# Patient Record
Sex: FEMALE | Race: WHITE | NOT HISPANIC OR LATINO | Employment: FULL TIME | URBAN - METROPOLITAN AREA
[De-identification: names, ages, dates, MRNs, and addresses within clinical notes are randomized per-mention and may not be internally consistent; named-entity substitution may affect disease eponyms.]

---

## 2022-07-10 ENCOUNTER — HOSPITAL ENCOUNTER (EMERGENCY)
Facility: HOSPITAL | Age: 52
Discharge: HOME | End: 2022-07-10
Attending: EMERGENCY MEDICINE | Admitting: EMERGENCY MEDICINE
Payer: COMMERCIAL

## 2022-07-10 VITALS
RESPIRATION RATE: 19 BRPM | HEART RATE: 81 BPM | DIASTOLIC BLOOD PRESSURE: 78 MMHG | TEMPERATURE: 97.6 F | SYSTOLIC BLOOD PRESSURE: 156 MMHG | OXYGEN SATURATION: 96 %

## 2022-07-10 DIAGNOSIS — I45.10 RBBB: ICD-10-CM

## 2022-07-10 DIAGNOSIS — R11.2 NAUSEA & VOMITING: ICD-10-CM

## 2022-07-10 DIAGNOSIS — R10.13 EPIGASTRIC PAIN: Primary | ICD-10-CM

## 2022-07-10 LAB
2HR DELTA HS TROPONIN: <-1 NG/L
ALBUMIN SERPL BCP-MCNC: 3.8 G/DL (ref 3.5–5)
ALP SERPL-CCNC: 112 U/L (ref 46–116)
ALT SERPL W P-5'-P-CCNC: 19 U/L (ref 12–78)
ANION GAP SERPL CALCULATED.3IONS-SCNC: 13 MMOL/L (ref 4–13)
AST SERPL W P-5'-P-CCNC: 15 U/L (ref 5–45)
B-HCG SERPL-ACNC: 6 MIU/ML
BASOPHILS # BLD AUTO: 0.06 THOUSANDS/ΜL (ref 0–0.1)
BASOPHILS NFR BLD AUTO: 1 % (ref 0–1)
BILIRUB DIRECT SERPL-MCNC: 0.06 MG/DL (ref 0–0.2)
BILIRUB SERPL-MCNC: 0.35 MG/DL (ref 0.2–1)
BUN SERPL-MCNC: 12 MG/DL (ref 5–25)
CALCIUM SERPL-MCNC: 9.3 MG/DL (ref 8.3–10.1)
CARDIAC TROPONIN I PNL SERPL HS: 3 NG/L
CARDIAC TROPONIN I PNL SERPL HS: <2 NG/L
CHLORIDE SERPL-SCNC: 102 MMOL/L (ref 100–108)
CO2 SERPL-SCNC: 27 MMOL/L (ref 21–32)
CREAT SERPL-MCNC: 0.87 MG/DL (ref 0.6–1.3)
EOSINOPHIL # BLD AUTO: 0.08 THOUSAND/ΜL (ref 0–0.61)
EOSINOPHIL NFR BLD AUTO: 1 % (ref 0–6)
ERYTHROCYTE [DISTWIDTH] IN BLOOD BY AUTOMATED COUNT: 12.4 % (ref 11.6–15.1)
GFR SERPL CREATININE-BSD FRML MDRD: 76 ML/MIN/1.73SQ M
GLUCOSE SERPL-MCNC: 165 MG/DL (ref 65–140)
HCG SERPL QL: ABNORMAL
HCT VFR BLD AUTO: 51.9 % (ref 34.8–46.1)
HGB BLD-MCNC: 16.8 G/DL (ref 11.5–15.4)
IMM GRANULOCYTES # BLD AUTO: 0.04 THOUSAND/UL (ref 0–0.2)
IMM GRANULOCYTES NFR BLD AUTO: 0 % (ref 0–2)
LIPASE SERPL-CCNC: 192 U/L (ref 73–393)
LYMPHOCYTES # BLD AUTO: 1.97 THOUSANDS/ΜL (ref 0.6–4.47)
LYMPHOCYTES NFR BLD AUTO: 19 % (ref 14–44)
MCH RBC QN AUTO: 29.5 PG (ref 26.8–34.3)
MCHC RBC AUTO-ENTMCNC: 32.4 G/DL (ref 31.4–37.4)
MCV RBC AUTO: 91 FL (ref 82–98)
MONOCYTES # BLD AUTO: 0.38 THOUSAND/ΜL (ref 0.17–1.22)
MONOCYTES NFR BLD AUTO: 4 % (ref 4–12)
NEUTROPHILS # BLD AUTO: 7.97 THOUSANDS/ΜL (ref 1.85–7.62)
NEUTS SEG NFR BLD AUTO: 75 % (ref 43–75)
NRBC BLD AUTO-RTO: 0 /100 WBCS
PLATELET # BLD AUTO: 328 THOUSANDS/UL (ref 149–390)
PMV BLD AUTO: 9.8 FL (ref 8.9–12.7)
POTASSIUM SERPL-SCNC: 3.9 MMOL/L (ref 3.5–5.3)
PROT SERPL-MCNC: 8.1 G/DL (ref 6.4–8.2)
RBC # BLD AUTO: 5.69 MILLION/UL (ref 3.81–5.12)
SARS-COV-2 RNA RESP QL NAA+PROBE: NEGATIVE
SODIUM SERPL-SCNC: 142 MMOL/L (ref 136–145)
WBC # BLD AUTO: 10.5 THOUSAND/UL (ref 4.31–10.16)

## 2022-07-10 PROCEDURE — 84703 CHORIONIC GONADOTROPIN ASSAY: CPT | Performed by: EMERGENCY MEDICINE

## 2022-07-10 PROCEDURE — 80053 COMPREHEN METABOLIC PANEL: CPT | Performed by: EMERGENCY MEDICINE

## 2022-07-10 PROCEDURE — 99284 EMERGENCY DEPT VISIT MOD MDM: CPT

## 2022-07-10 PROCEDURE — 84702 CHORIONIC GONADOTROPIN TEST: CPT | Performed by: EMERGENCY MEDICINE

## 2022-07-10 PROCEDURE — 83690 ASSAY OF LIPASE: CPT | Performed by: EMERGENCY MEDICINE

## 2022-07-10 PROCEDURE — 96374 THER/PROPH/DIAG INJ IV PUSH: CPT

## 2022-07-10 PROCEDURE — U0003 INFECTIOUS AGENT DETECTION BY NUCLEIC ACID (DNA OR RNA); SEVERE ACUTE RESPIRATORY SYNDROME CORONAVIRUS 2 (SARS-COV-2) (CORONAVIRUS DISEASE [COVID-19]), AMPLIFIED PROBE TECHNIQUE, MAKING USE OF HIGH THROUGHPUT TECHNOLOGIES AS DESCRIBED BY CMS-2020-01-R: HCPCS | Performed by: EMERGENCY MEDICINE

## 2022-07-10 PROCEDURE — 84484 ASSAY OF TROPONIN QUANT: CPT | Performed by: EMERGENCY MEDICINE

## 2022-07-10 PROCEDURE — 93005 ELECTROCARDIOGRAM TRACING: CPT

## 2022-07-10 PROCEDURE — 82248 BILIRUBIN DIRECT: CPT | Performed by: EMERGENCY MEDICINE

## 2022-07-10 PROCEDURE — 36415 COLL VENOUS BLD VENIPUNCTURE: CPT | Performed by: EMERGENCY MEDICINE

## 2022-07-10 PROCEDURE — U0005 INFEC AGEN DETEC AMPLI PROBE: HCPCS | Performed by: EMERGENCY MEDICINE

## 2022-07-10 PROCEDURE — 96375 TX/PRO/DX INJ NEW DRUG ADDON: CPT

## 2022-07-10 PROCEDURE — 85025 COMPLETE CBC W/AUTO DIFF WBC: CPT | Performed by: EMERGENCY MEDICINE

## 2022-07-10 PROCEDURE — 99285 EMERGENCY DEPT VISIT HI MDM: CPT | Performed by: EMERGENCY MEDICINE

## 2022-07-10 RX ORDER — FAMOTIDINE 20 MG/1
20 TABLET, FILM COATED ORAL 2 TIMES DAILY
Qty: 30 TABLET | Refills: 0 | Status: SHIPPED | OUTPATIENT
Start: 2022-07-10

## 2022-07-10 RX ORDER — MAGNESIUM HYDROXIDE/ALUMINUM HYDROXICE/SIMETHICONE 120; 1200; 1200 MG/30ML; MG/30ML; MG/30ML
30 SUSPENSION ORAL ONCE
Status: COMPLETED | OUTPATIENT
Start: 2022-07-10 | End: 2022-07-10

## 2022-07-10 RX ORDER — MAGNESIUM HYDROXIDE/ALUMINUM HYDROXICE/SIMETHICONE 120; 1200; 1200 MG/30ML; MG/30ML; MG/30ML
30 SUSPENSION ORAL ONCE
Status: DISCONTINUED | OUTPATIENT
Start: 2022-07-10 | End: 2022-07-10 | Stop reason: HOSPADM

## 2022-07-10 RX ORDER — KETOROLAC TROMETHAMINE 30 MG/ML
15 INJECTION, SOLUTION INTRAMUSCULAR; INTRAVENOUS ONCE
Status: COMPLETED | OUTPATIENT
Start: 2022-07-10 | End: 2022-07-10

## 2022-07-10 RX ORDER — ONDANSETRON 4 MG/1
4 TABLET, ORALLY DISINTEGRATING ORAL EVERY 6 HOURS PRN
Qty: 20 TABLET | Refills: 0 | Status: SHIPPED | OUTPATIENT
Start: 2022-07-10

## 2022-07-10 RX ORDER — ONDANSETRON 2 MG/ML
4 INJECTION INTRAMUSCULAR; INTRAVENOUS ONCE
Status: COMPLETED | OUTPATIENT
Start: 2022-07-10 | End: 2022-07-10

## 2022-07-10 RX ORDER — FAMOTIDINE 20 MG/1
20 TABLET, FILM COATED ORAL ONCE
Status: COMPLETED | OUTPATIENT
Start: 2022-07-10 | End: 2022-07-10

## 2022-07-10 RX ADMIN — FAMOTIDINE 20 MG: 20 TABLET ORAL at 04:36

## 2022-07-10 RX ADMIN — KETOROLAC TROMETHAMINE 15 MG: 30 INJECTION, SOLUTION INTRAMUSCULAR at 04:36

## 2022-07-10 RX ADMIN — ONDANSETRON 4 MG: 2 INJECTION INTRAMUSCULAR; INTRAVENOUS at 04:36

## 2022-07-10 RX ADMIN — ALUMINUM HYDROXIDE, MAGNESIUM HYDROXIDE, AND SIMETHICONE 30 ML: 200; 200; 20 SUSPENSION ORAL at 04:36

## 2022-07-10 NOTE — ED PROVIDER NOTES
Final Diagnosis:  1  Epigastric pain    2  RBBB    3  Nausea & vomiting        Chief Complaint   Patient presents with    Abdominal Pain     Pt reports intermittent lower middle abdominal pain that feels like "cramping" that started yesterday, rates pain an 8, also reports nausea, with 6 bouts of vomiting  HPI  13-year-old presents with epigastric pain  Crampy colicky  She ate some questionable macro any salad  Had some vomiting thereafter  She also notes some lower abdominal pain as well  Vomiting nonbloody nonbilious  Normal bowel movements no urinary symptoms  No chest pain no shortness of breath  She does smoke  No prior abdominal surgeries beyond a hernia repair with mesh  Soft abdomen no CVA tenderness  Nondistressed appearing    - No language barrier    - History obtained from patient  - There are no limitations to the history obtained  - Previous charting underwent limited review with attention to last ED visits, labs, ekgs, and prior imaging  PMH:   has no past medical history on file  PSH:   has a past surgical history that includes Hernia repair  Social History:    Tobacco Use: High Risk    Smoking Tobacco Use: Current Every Day Smoker    Smokeless Tobacco Use: Never Used     Alcohol Use: Not on file     Patient with no illicit use    ROS:    Pertinent positives/negatives:   Review of Systems   Respiratory: Negative for shortness of breath  Cardiovascular: Negative for chest pain  Gastrointestinal: Positive for abdominal pain, nausea and vomiting  Negative for abdominal distention, constipation and diarrhea  Genitourinary: Negative for dysuria and flank pain  CONSTITUTIONAL:  No dizziness  No weakness  No unexpected weight loss  EYES:  No pain, erythema, or discharge  No loss of vision  ENT:  No tinnitus, decreased hearing  No epistaxis/purulent drainage  No voice change, airway closing, trismus  CARDIOVASCULAR:  No chest pain  No palpitations   No new lower extremity edema  RESPIRATORY:  No purulent cough  No hemoptysis  No dyspnea  No paroxysmal nocturnal dyspnea  No stridor, audible wheezing bedside  GASTROINTESTINAL:  Normal appetite  No vomiting, diarrhea  No pain  No bloating  No melena  GENITOURINARY:  No frequency, urgency, nocturia  No hematuria or dysuria  No discharge  No sores/adenopathy  MUSCULOSKELETAL:  No arthralgias or myalgias that are new  INTEGUMENTARY:  No swelling  No unexpected contusions  No abrasions  No lymphangitis  NEUROLOGIC:  No meningismus  No numbness of the extremities  No new focal weakness  No postural instability  PSYCHIATRIC:  No SI HI AVH  HEMATOLOGICAL:  No bleeding  No petechiae  No bruising  ALLERGIES:  No urticaria  No sudden abd cramping  No stridor  PE:     Physical exam highlights:   Physical Exam       Vitals:    07/10/22 0630 07/10/22 0645 07/10/22 0730 07/10/22 0739   BP: (!) 189/91  156/78    BP Location:       Pulse: 81 88 79 81   Resp:  19     Temp:       TempSrc:       SpO2: 94% 94% 96% 96%     Vitals reviewed by me  Nursing note reviewed  Chaperone present for all sensitive exam   Const: No acute distress  Alert  Nontoxic  Not diaphoretic  HEENT: External ears normal  No protrusion drainage swelling  Nose normal  No drainage/traumatic deformity  MMM  Mouth with baseline/symmetric movement  No trismus  Eyes: No squinting  No icterus  Tracks through the room with normal EOM  No tearing/swelling/drainage  Neck: ROM normal  No rigidity  No meningismus  Cards: Rate as per vitals  Compared to monitor sinus unless documented above  Regular  Well perfused  Pulm: able to verbalize without additional effort  Effort and excursion normal  No disress  No audible wheezing/ stridor  Normal resp rate  Abd: No distension beyond baseline  No fluctuant wave  Patient without peritoneal pain with shifting/bumping the bed  MSK: ROM normal and baseline  No deformity  Skin: No new rashes visible  Well perfused  Neuro: Nonfocal  Baseline  CN grossly intact  Moving all four with coordination  Psych: Normal behavior and affect  A:  - Nursing note reviewed  Ddx and MDM  PUD  Gastritis  Food poisoning  Cholecystitis  Pancreatitis unlikely  Atypical ACS    HEART Risk Score    Flowsheet Row Most Recent Value   Heart Score Risk Calculator    History 0 Filed at: 07/11/2022 0111   ECG 1 Filed at: 07/11/2022 0111   Age 1 Filed at: 07/11/2022 0111   Risk Factors 1 Filed at: 07/11/2022 0111   Troponin --   HEART Score --            RBBB  No prior EKG  Will follow with PCP for w/u           ED Course as of 07/11/22 0114   Sun Jul 10, 2022   6474 Procedure Note: EKG  Date/Time: 07/10/22 5:27 AM   Interpreted by: Kathi Escalona  Indications / Diagnosis: epigastric pain   ECG reviewed by me, the ED Provider: yes   The EKG demonstrates:  Rhythm: sinus   Intervals: normal intervals  Axis: left  axis  QRS/Blocks: RBBB QRS  ST Changes: No acute ST Changes, no STD/LUCY    T wave changes: inferior inversion, anterior inversion         No orders to display     Orders Placed This Encounter   Procedures    COVID only    CBC and differential    Comprehensive metabolic panel    Bilirubin, direct    Lipase    hCG, qualitative pregnancy    HS Troponin 0hr (reflex protocol)    hCG, quantitative    HS Troponin I 2hr    Urinalysis with microscopic    ECG 12 lead     Labs Reviewed   CBC AND DIFFERENTIAL - Abnormal       Result Value Ref Range Status    WBC 10 50 (*) 4 31 - 10 16 Thousand/uL Final    RBC 5 69 (*) 3 81 - 5 12 Million/uL Final    Hemoglobin 16 8 (*) 11 5 - 15 4 g/dL Final    Hematocrit 51 9 (*) 34 8 - 46 1 % Final    MCV 91  82 - 98 fL Final    MCH 29 5  26 8 - 34 3 pg Final    MCHC 32 4  31 4 - 37 4 g/dL Final    RDW 12 4  11 6 - 15 1 % Final    MPV 9 8  8 9 - 12 7 fL Final    Platelets 301  650 - 390 Thousands/uL Final    nRBC 0  /100 WBCs Final    Neutrophils Relative 75  43 - 75 % Final    Immat GRANS % 0  0 - 2 % Final    Lymphocytes Relative 19  14 - 44 % Final    Monocytes Relative 4  4 - 12 % Final    Eosinophils Relative 1  0 - 6 % Final    Basophils Relative 1  0 - 1 % Final    Neutrophils Absolute 7 97 (*) 1 85 - 7 62 Thousands/µL Final    Immature Grans Absolute 0 04  0 00 - 0 20 Thousand/uL Final    Lymphocytes Absolute 1 97  0 60 - 4 47 Thousands/µL Final    Monocytes Absolute 0 38  0 17 - 1 22 Thousand/µL Final    Eosinophils Absolute 0 08  0 00 - 0 61 Thousand/µL Final    Basophils Absolute 0 06  0 00 - 0 10 Thousands/µL Final   COMPREHENSIVE METABOLIC PANEL - Abnormal    Sodium 142  136 - 145 mmol/L Final    Potassium 3 9  3 5 - 5 3 mmol/L Final    Chloride 102  100 - 108 mmol/L Final    CO2 27  21 - 32 mmol/L Final    ANION GAP 13  4 - 13 mmol/L Final    BUN 12  5 - 25 mg/dL Final    Creatinine 0 87  0 60 - 1 30 mg/dL Final    Comment: Standardized to IDMS reference method    Glucose 165 (*) 65 - 140 mg/dL Final    Comment: If the patient is fasting, the ADA then defines impaired fasting glucose as > 100 mg/dL and diabetes as > or equal to 123 mg/dL  Specimen collection should occur prior to Sulfasalazine administration due to the potential for falsely depressed results  Specimen collection should occur prior to Sulfapyridine administration due to the potential for falsely elevated results  Calcium 9 3  8 3 - 10 1 mg/dL Final    AST 15  5 - 45 U/L Final    Comment: Specimen collection should occur prior to Sulfasalazine administration due to the potential for falsely depressed results  ALT 19  12 - 78 U/L Final    Comment: Specimen collection should occur prior to Sulfasalazine administration due to the potential for falsely depressed results       Alkaline Phosphatase 112  46 - 116 U/L Final    Total Protein 8 1  6 4 - 8 2 g/dL Final    Albumin 3 8  3 5 - 5 0 g/dL Final    Total Bilirubin 0 35  0 20 - 1 00 mg/dL Final    Comment: Use of this assay is not recommended for patients undergoing treatment with eltrombopag due to the potential for falsely elevated results  eGFR 76  ml/min/1 73sq m Final    Narrative:     [place] guidelines for Chronic Kidney Disease (CKD):     Stage 1 with normal or high GFR (GFR > 90 mL/min/1 73 square meters)    Stage 2 Mild CKD (GFR = 60-89 mL/min/1 73 square meters)    Stage 3A Moderate CKD (GFR = 45-59 mL/min/1 73 square meters)    Stage 3B Moderate CKD (GFR = 30-44 mL/min/1 73 square meters)    Stage 4 Severe CKD (GFR = 15-29 mL/min/1 73 square meters)    Stage 5 End Stage CKD (GFR <15 mL/min/1 73 square meters)  Note: GFR calculation is accurate only with a steady state creatinine   PREGNANCY TEST (HCG QUALITATIVE) - Abnormal    Preg, Serum Borderline (*) Negative Final   NOVEL CORONAVIRUS (COVID-19), PCR [facility] - Normal    SARS-CoV-2 Negative  Negative Final    Narrative:     FOR PEDIATRIC PATIENTS - copy/paste COVID Guidelines URL to browser: [email or web address]    SARS-CoV-2 assay is a Nucleic Acid Amplification assay intended for the  qualitative detection of nucleic acid from SARS-CoV-2 in nasopharyngeal  swabs  Results are for the presumptive identification of SARS-CoV-2 RNA  Positive results are indicative of infection with SARS-CoV-2, the virus  causing COVID-19, but do not rule out bacterial infection or co-infection  with other viruses  Laboratories within the [place] and its  territories are required to report all positive results to the appropriate  public health authorities  Negative results do not preclude SARS-CoV-2  infection and should not be used as the sole basis for treatment or other  patient management decisions  Negative results must be combined with  clinical observations, patient history, and epidemiological information  This test has not been FDA cleared or approved  This test has been authorized by FDA under an Emergency Use Authorization  (EUA)   This test is only authorized for the duration of time the  declaration that circumstances exist justifying the authorization of the  emergency use of an in vitro diagnostic tests for detection of SARS-CoV-2  virus and/or diagnosis of COVID-19 infection under section 564(b)(1) of  the Act, 21 U  S C  124ZQR-6(J)(3), unless the authorization is terminated  or revoked sooner  The test has been validated but independent review by FDA  and CLIA is pending  Test performed using [employer] Gene[employer]: This RT-PCR assay targets N2,  a region unique to SARS-CoV-2  A conserved region in the E-gene was chosen  for pan-Sarbecovirus detection which includes SARS-CoV-2  BILIRUBIN, DIRECT - Normal    Bilirubin, Direct 0 06  0 00 - 0 20 mg/dL Final    Comment: Slightly Hemolyzed; Results May be Affected   LIPASE - Normal    Lipase 192  73 - 393 u/L Final   HS TROPONIN I 0HR - Normal    hs TnI 0hr 3  "Refer to ACS Flowchart"- see link ng/L Final    Comment:                                              Initial (time 0) result  If >=50 ng/L, Myocardial injury suggested ;  Type of myocardial injury and treatment strategy  to be determined  If 5-49 ng/L, a delta result at 2 hours and or 4 hours will be needed to further evaluate  If <4 ng/L, and chest pain has been >3 hours since onset, patient may qualify for discharge based on the HEART score in the ED  If <5 ng/L and <3hours since onset of chest pain, a delta result at 2 hours will be needed to further evaluate  HS Troponin 99th Percentile URL of a Health Population=12 ng/L with a 95% Confidence Interval of 8-18 ng/L  Second Troponin (time 2 hours)  If calculated delta >= 20 ng/L,  Myocardial injury suggested ; Type of myocardial injury and treatment strategy to be determined  If 5-49 ng/L and the calculated delta is 5-19 ng/L, consult medical service for evaluation  Continue evaluation for ischemia on ecg and other possible etiology and repeat hs troponin at 4 hours    If delta is <5 ng/L at 2 hours, consider discharge based on risk stratification via the HEART score (if in ED), or JINA risk score in IP/Observation  HS Troponin 99th Percentile URL of a Health Population=12 ng/L with a 95% Confidence Interval of 8-18 ng/L    HCG, QUANTITATIVE - Normal    HCG, Quant 6  <=6 mIU/mL Final    Narrative:      Expected Ranges:     Approximate               Approximate HCG  Gestation age          Concentration ( mIU/mL)  _____________          ______________________   [place]                      HCG values  0 2-1                       5-50  1-2                           2-3                         100-5000  3-4                         500-43001  4-5                         1000-84355  5-6                         [id number]  6-8                         [id number]  8-12                        [id number]     HS TROPONIN I 2HR - Normal    hs TnI 2hr <2  "Refer to ACS Flowchart"- see link ng/L Final    Comment:                                              Initial (time 0) result  If >=50 ng/L, Myocardial injury suggested ;  Type of myocardial injury and treatment strategy  to be determined  If 5-49 ng/L, a delta result at 2 hours and or 4 hours will be needed to further evaluate  If <4 ng/L, and chest pain has been >3 hours since onset, patient may qualify for discharge based on the HEART score in the ED  If <5 ng/L and <3hours since onset of chest pain, a delta result at 2 hours will be needed to further evaluate  HS Troponin 99th Percentile URL of a Health Population=12 ng/L with a 95% Confidence Interval of 8-18 ng/L  Second Troponin (time 2 hours)  If calculated delta >= 20 ng/L,  Myocardial injury suggested ; Type of myocardial injury and treatment strategy to be determined  If 5-49 ng/L and the calculated delta is 5-19 ng/L, consult medical service for evaluation  Continue evaluation for ischemia on ecg and other possible etiology and repeat hs troponin at 4 hours    If delta is <5 ng/L at 2 hours, consider discharge based on risk stratification via the HEART score (if in ED), or JINA risk score in IP/Observation  HS Troponin 99th Percentile URL of a Health Population=12 ng/L with a 95% Confidence Interval of 8-18 ng/L  Delta 2hr hsTnI <-1  <20 ng/L Final       Final Diagnosis:  1  Epigastric pain    2  RBBB    3  Nausea & vomiting        P:  - hospital tx includes   Medications   ketorolac (TORADOL) injection 15 mg (15 mg Intravenous Given 7/10/22 0436)   aluminum-magnesium hydroxide-simethicone (MYLANTA) oral suspension 30 mL (30 mL Oral Given 7/10/22 0436)   ondansetron (ZOFRAN) injection 4 mg (4 mg Intravenous Given 7/10/22 0436)   famotidine (PEPCID) tablet 20 mg (20 mg Oral Given 7/10/22 0436)         - disposition  Time reflects when diagnosis was documented in both MDM as applicable and the Disposition within this note     Time User Action Codes Description Comment    7/10/2022  7:05 AM Madsen Pott Add [R10 13] Epigastric pain     7/10/2022  7:05 AM Madsen Pott Add [I45 10] RBBB     7/10/2022  7:05 AM Madsen Pott Add [R11 2] Nausea & vomiting       ED Disposition     ED Disposition   Discharge    Condition   Stable    Date/Time   Sun Jul 10, 2022  7:04 AM    Comment   [address] discharge to home/self care  Follow-up Information     Follow up With Specialties Details Why Contact Info    primary care    follow with your primary care doctor regarding right bundle brach block    Maranda Dakin, MD Obstetrics and Gynecology, Obstetrics, Gynecology   [address] [facility]  [phone]            - patient will call their PCP to let them know they were in the emergency department   We discuss return precautions       - additional tx intended, if consistent with primary provider:  - patient to follow with :      Discharge Medication List as of 7/10/2022  7:23 AM      START taking these medications    Details famotidine (PEPCID) 20 mg tablet Take 1 tablet (20 mg total) by mouth 2 (two) times a day, Starting Sun 7/10/2022, Normal      ondansetron (Zofran ODT) 4 mg disintegrating tablet Take 1 tablet (4 mg total) by mouth every 6 (six) hours as needed for nausea or vomiting, Starting Sun 7/10/2022, Normal           Outpatient Discharge Orders   Urinalysis with microscopic   Standing Status: Future Standing Exp  Date: 07/10/23     None       Portions of the record may have been created with voice recognition software  Occasional wrong word or "sound a like" substitutions may have occurred due to the inherent limitations of voice recognition software  Read the chart carefully and recognize, using context, where substitutions have occurred      Electronically signed by:  MD Oz Diaz MD  07/11/22 9734

## 2022-07-10 NOTE — Clinical Note
Rojas Fountain was seen and treated in our emergency department on 7/10/2022  Diagnosis:     Kiko Pablo  may return to work on return date  She may return on this date: 07/11/2022         If you have any questions or concerns, please don't hesitate to call        Radha Albert, DO    ______________________________           _______________          _______________  Hospital Representative                              Date                                Time

## 2022-07-10 NOTE — DISCHARGE INSTRUCTIONS
Please follow up with your OB, or as listed below  Please get outpatient UA if you have symptoms of urinary frequency, burning   If you have chest pressure, shortness of breath please return to the ED  Follow up with your doctor

## 2022-07-10 NOTE — ED CARE HANDOFF
Emergency Department Sign Out Note                                 Patient signed out to me pending 2nd troponin  Labs reviewed talk to the patient at length  I told her to follow up with an outpatient cardiologist through her family doctor and also referred her to [facility] gyn given her equal call beta hCG results  She verbalized understanding and no further questions at time of discharge  Patient has no dysuria urgency or frequency at the present time and she did not want to wait for urine sample  So I gave her a prescription for urinalysis to be done outpatient should she develop symptoms  Procedures  MDM        Disposition  Final diagnoses:   Epigastric pain   RBBB   Nausea & vomiting     Time reflects when diagnosis was documented in both MDM as applicable and the Disposition within this note     Time User Action Codes Description Comment    7/10/2022  7:05 AM Valeria Tray Add [R10 13] Epigastric pain     7/10/2022  7:05 AM Valeria Tray Add [I45 10] RBBB     7/10/2022  7:05 AM Valeria Tray Add [R11 2] Nausea & vomiting       ED Disposition     ED Disposition   Discharge    Condition   Stable    Date/Time   Sun Jul 10, 2022  7:04 AM    Comment   [address] [address] discharge to home/self care                 Follow-up Information     Follow up With Specialties Details Why Contact Info    primary care    follow with your primary care doctor regarding right bundle brach block    Jayesh Hanks MD Obstetrics and Gynecology, Obstetrics, Gynecology   [address]  [facility] [address]  [phone]          Discharge Medication List as of 7/10/2022  7:23 AM      START taking these medications    Details   famotidine (PEPCID) 20 mg tablet Take 1 tablet (20 mg total) by mouth 2 (two) times a day, Starting Sun 7/10/2022, Normal      ondansetron (Zofran ODT) 4 mg disintegrating tablet Take 1 tablet (4 mg total) by mouth every 6 (six) hours as needed for nausea or vomiting, Starting Sun 7/10/2022, Normal           Outpatient Discharge Orders   Urinalysis with microscopic   Standing Status: Future Standing Exp   Date: 07/10/23          ED Provider  Electronically Signed by     April Ellis DO  07/10/22 1226

## 2022-07-11 LAB
ATRIAL RATE: 84 BPM
P AXIS: 60 DEGREES
PR INTERVAL: 162 MS
QRS AXIS: -1 DEGREES
QRSD INTERVAL: 140 MS
QT INTERVAL: 410 MS
QTC INTERVAL: 484 MS
T WAVE AXIS: 12 DEGREES
VENTRICULAR RATE: 84 BPM

## 2022-07-11 PROCEDURE — 93010 ELECTROCARDIOGRAM REPORT: CPT | Performed by: INTERNAL MEDICINE
